# Patient Record
Sex: MALE | URBAN - METROPOLITAN AREA
[De-identification: names, ages, dates, MRNs, and addresses within clinical notes are randomized per-mention and may not be internally consistent; named-entity substitution may affect disease eponyms.]

---

## 2021-09-09 ENCOUNTER — OFFICE VISIT (OUTPATIENT)
Dept: URBAN - METROPOLITAN AREA CLINIC 13 | Facility: CLINIC | Age: 66
End: 2021-09-09

## 2021-09-09 DIAGNOSIS — H25.21 AGE-RELATED CATARACT, MORGAGNIAN TYPE, RIGHT EYE: ICD-10-CM

## 2021-09-09 DIAGNOSIS — H33.41 TRACTION DETACHMENT OF RETINA, RIGHT EYE: Primary | ICD-10-CM

## 2021-09-09 DIAGNOSIS — H44.522 PHTHISIS BULBI OF LEFT EYE: ICD-10-CM

## 2021-09-09 PROCEDURE — 76512 OPH US DX B-SCAN: CPT | Performed by: OPHTHALMOLOGY

## 2021-09-09 PROCEDURE — 99204 OFFICE O/P NEW MOD 45 MIN: CPT | Performed by: OPHTHALMOLOGY

## 2021-09-09 ASSESSMENT — INTRAOCULAR PRESSURE
OS: 86
OD: 22

## 2021-09-09 NOTE — IMPRESSION/PLAN
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 Plan: NLP with high IOP, but relative comfort. 
F/u with Dr. Suraj Yan

## 2021-09-09 NOTE — IMPRESSION/PLAN
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 B-scan concerning for open funnel RD, though could represent really dense vitreous still adherent to optic nerve, perhaps old VH due to his DM. Given that this is his better eye, would rec surgical repair in combination with cataract removal.

 We discussed the natural history and the risks and benefit of repairing the TRD, which consists of vitrectomy surgery vs observation. With repair, hopefully we can reduce the risk of further visual loss. Risks of surgery include but are not limited to loss of eye, blindness, infection, scar tissue formation, recurrent retinal tears and detachment, glaucoma, change in refractive error, ptosis, need for further surgery, epiretinal membranes, CME and use of gas or silicone oil. The patient elects to proceed with vitrectomy surgery. 

PLAN: 27g PPV/EL/poss Gas x TRD (combo with cataract surgery) OD

## 2021-09-16 ENCOUNTER — POST-OPERATIVE VISIT (OUTPATIENT)
Dept: URBAN - METROPOLITAN AREA CLINIC 13 | Facility: CLINIC | Age: 66
End: 2021-09-16

## 2021-09-16 PROCEDURE — 99024 POSTOP FOLLOW-UP VISIT: CPT | Performed by: OPHTHALMOLOGY

## 2021-09-16 ASSESSMENT — INTRAOCULAR PRESSURE
OS: 70
OD: 66

## 2021-09-16 NOTE — IMPRESSION/PLAN
Impression: S/P 27g PPV EL POSS GAS x TRD OD - 1 Day. Traction detachment of retina, right eye  H33.41. Plan: No infection. IOP elevated. Retina attached. AC tap performed to lower IOP. IOP after tap is 44 mmHg. Unable to remove any more aqueous due to shallowing of AC chamber. Instructed patient to restart Timolol, Dorzolamide and Brimondine in addtion to Oflox/Pred.   

Return in 1 week

## 2021-09-23 ENCOUNTER — POST-OPERATIVE VISIT (OUTPATIENT)
Dept: URBAN - METROPOLITAN AREA CLINIC 13 | Facility: CLINIC | Age: 66
End: 2021-09-23

## 2021-09-23 PROCEDURE — 99024 POSTOP FOLLOW-UP VISIT: CPT | Performed by: OPHTHALMOLOGY

## 2021-09-23 ASSESSMENT — INTRAOCULAR PRESSURE
OS: 39
OD: 27

## 2021-09-23 NOTE — IMPRESSION/PLAN
Impression: S/P 27g PPV EL POSS GAS x TRD OD - 8 Days. Traction detachment of retina, right eye  H33.41. Excellent post op course   Post operative instructions reviewed - Condition is improving - Plan: No s/s of RD/infection VA/IOP acceptable Has vague LP vision. IOP better. CSM with drops. Post-operative instructions and precautions Reviewed. Gas pos/prec. Call ASAP with changes --Taper Prednisolone acetate 1% TID x 1 wk, BID x 1wk, QD x 1wk, then d/c
--Discontinue Ocuflox 1 week with Dr. Billy Chiu
1 month POS/OCT